# Patient Record
(demographics unavailable — no encounter records)

---

## 2024-12-14 NOTE — HISTORY OF PRESENT ILLNESS
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  Pt had an original resection and marsupialization after trauma and a bleed with a recurrence and sclerotherapy which was partially successful.  He has been followed since with serial US exams every 6 months.  He is otherwise asymptomatic but has restrictive activity.  His last US this month showed a splenic cyst that was 7.3 x 6.2 x 7.8cm compared to the previous US which was 7.7 x 6.7 x 8.4cm- so stable and maybe even slightly smaller.  He is here for a follow up visit.

## 2024-12-14 NOTE — ASSESSMENT
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  We will stay the course for now and continue to monitor him every 6 months with serial US exams.  He will be reevaluated for consideration of a possible laparoscopic splenectomy after his 18th birthday.  He will return to the office in 6 months after his next US or sooner if warranted.

## 2024-12-14 NOTE — PHYSICAL EXAM
[Acute Distress] : no acute distress [Alert] : alert [Toxic appearing] : well appearing [NL] : soft, not tender, not distended [TextBox_37] : Soft, non-tender, non-distended, with positive bowel sounds.  There are no masses and no organomegaly.  no mass in epigastrium nor left side and no spleen tip felt.

## 2024-12-14 NOTE — REASON FOR VISIT
[Follow-up - Scheduled] : a follow-up, scheduled visit for [Mother] : mother [FreeTextEntry3] : splenic cyst [FreeTextEntry4] : ARPAN GARCIA

## 2024-12-14 NOTE — CONSULT LETTER
[Dear  ___] : Dear  [unfilled], [Please see my note below.] : Please see my note below. [FreeTextEntry1] : I had the pleasure of seeing ESTELLE CRESPO in my office on Dec 14, 2024 Thank you very much for letting me participate in ESTELLE CRESPO 's care and I will keep you informed of his progress. Sincerely, Brijesh Caballero M.D.

## 2024-12-14 NOTE — HISTORY OF PRESENT ILLNESS
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  Pt had an original resection and marsupialization after trauma and a bleed with a recurrence and sclerotherapy which was partially successful.  He has been followed since with serial US exams every 6 months.  He is otherwise asymptomatic but has restrictive activity.  His last US this month showed a splenic cyst that was 7.3 x 6.2 x 7.8cm compared to the previous US which was 7.7 x 6.7 x 8.4cm- so stable and maybe even slightly smaller.  He is here for a follow up visit. No Repair - Repaired With Adjacent Surgical Defect Text (Leave Blank If You Do Not Want): After obtaining clear surgical margins the defect was repaired concurrently with another surgical defect which was in close approximation.

## 2025-07-29 NOTE — HISTORY OF PRESENT ILLNESS
[Up to date] : Up to date [Eats regular meals including adequate fruits and vegetables] : eats regular meals including adequate fruits and vegetables [At least 1 hour of physical activity a day] : at least 1 hour of physical activity a day [Screen time (except homework) less than 2 hours a day] : screen time (except homework) less than 2 hours a day [Has interests/participates in community activities/volunteers] : has interests/participates in community activities/volunteers. [Uses drugs] : uses drugs  [Exposure to drugs] : exposure to drugs [Exposure to alcohol] : exposure to alcohol [Uses safety belts/safety equipment] : uses safety belts/safety equipment  [NO] : No [Yes] : Patient goes to dentist yearly [Has ways to cope with stress] : has ways to cope with stress [Gets depressed, anxious, or irritable/has mood swings] : gets depressed, anxious, or irritable/has mood swings [Has thought about hurting self or considered suicide] : has thought about hurting self or considered suicide [de-identified] : No concerns. [Mother] : mother [No] : Patient does not go to dentist yearly [Sleep Concerns] : no sleep concerns [Drinks non-sweetened liquids] : does not drink non-sweetened liquids  [Has concerns about body or appearance] : does not have concerns about body or appearance [Has friends] : does not have friends [Uses electronic nicotine delivery system] : does not use electronic nicotine delivery system [Exposure to electronic nicotine delivery system] : no exposure to electronic nicotine delivery system [Uses tobacco] : does not use tobacco [Exposure to tobacco] : no exposure to tobacco [Drinks alcohol] : does not drink alcohol [Has problems with sleep] : does not have problems with sleep [de-identified] : lives with mom, two brothers (twin, 18), 2 cousins. safe at home, no food insecurity, feels safe at home [de-identified] : senior in highschool, wants to become , good grades, now suspended due to legal problems - has court for attempted murder [de-identified] : feels he is good weight, varied diet with fruits and vegetables [de-identified] : no friends "dont really want friends", not bullied, 6+ hours a day on phone (tik tok), enjoys boxing [de-identified] : smokes cannabis (wax) twice a week, doesn't drink alcohol ("maybe a sip here and there") [de-identified] : multiple female partners in the past, now only partner is girlfriend, does not use protection [de-identified] : see narrative for details [FreeTextEntry1] : 17 year-old M with PMH of eczema, and recurrent splenic cyst presenting for yearly wellness visit. Reports concern of "eczema on penis tip." Reports no itching, redness, burning on urination. Is sexually active with his girlfriend with no protection.   Patient reports feeling depressed non-suicidal self-injurious behavior for the first time 2 weeks ago where he cut himself on the shoulders and thighs, with no intent to die. He endorses passive SI ("wishes he was in a shooting") but no active SI, plan or intent. Also endorses low interest, guilt, low energy, psychomotor retardation. Patient is awaiting court case on August 10, 2025 for attempted murder. He is not interested in seeing a psychiatrist/therapist. Aware of suicide hotline/ED if he feels like he want to hurt himself.

## 2025-07-29 NOTE — PHYSICAL EXAM
[No Acute Distress] : no acute distress [Normocephalic] : normocephalic [Pink Nasal Mucosa] : pink nasal mucosa [Nonerythematous Oropharynx] : nonerythematous oropharynx [Supple, full passive range of motion] : supple, full passive range of motion [Clear to Auscultation Bilaterally] : clear to auscultation bilaterally [Regular Rate and Rhythm] : regular rate and rhythm [Normal S1, S2 audible] : normal S1, S2 audible [Soft] : soft [NonTender] : non tender [Non Distended] : non distended [Uncircumcised] : uncircumcised [No Abnormal Lymph Nodes Palpated] : no abnormal lymph nodes palpated [No Rash or Lesions] : no rash or lesions [Brennon: _____] : Brennon [unfilled] [Foreskin easily retractable] : foreskin easily retractable [No Testicular Masses] : no testicular masses [FreeTextEntry6] : Chaperone Genesis Gilbert MD present

## 2025-07-29 NOTE — DISCUSSION/SUMMARY
[Normal Growth] : growth [Normal Development] : development  [FreeTextEntry1] : 17 year old male with a history presenting for annual health care maintenance.  CIR reviewed and vaccines are up to date. Does not have a dental home. +Mental health concerns but no acute safety concerns. HEADSS assessment otherwise dose not demonstrate any health risks. Reviewed growth chart and no concerns at this time. Failed vision screen today.   - Labs: A1c, lipid profile  - Immunizations:  UTD - Referral: Dentist, Dermatology  - Failed vision screen but has ophthalmologist   #Passive SI #Nonsuicidal self-injurious behavior #MDD vs. Adjustment disorder Patient reports feeling depressed non-suicidal self-injurious behavior for the first time 2 weeks ago where he cut himself on the shoulders and thighs, with no intent to die. He endorses passive SI ("wishes he was in a shooting") but no active SI, plan or intent. Also endorses low interest, guilt, low energy, psychomotor retardation. This is in the context of legal problems (court case August 10 for attempted murder). - Mental Health:  Conducted a standardized annual depression screening using an approved tool (e.g., PHQ-9) and spent at least 5 - 15 minutes reviewing the results with the patient. - He is not interested in seeing a psychiatrist/therapist.  - Aware of suicide hotline/ED if he feels like he wants to hurt himself.  - Will follow up after court case   #Sexually active PT is sexuallty active with 1 female partner with no protection. Pt was concerned of bumps on penis, but was ensured that this was normal anatomy, no concerns.  - STI testing  #Eczema - dermatology referral   I spent at least 15 minutes providing preventive counseling and/or risk factor reduction interventions, addressing issues such as family problems, nutrition and exercise, substance abuse, sexual practices, injury prevention, and other health-related behaviors.

## 2025-07-29 NOTE — HISTORY OF PRESENT ILLNESS
[Up to date] : Up to date [Eats regular meals including adequate fruits and vegetables] : eats regular meals including adequate fruits and vegetables [At least 1 hour of physical activity a day] : at least 1 hour of physical activity a day [Screen time (except homework) less than 2 hours a day] : screen time (except homework) less than 2 hours a day [Has interests/participates in community activities/volunteers] : has interests/participates in community activities/volunteers. [Uses drugs] : uses drugs  [Exposure to drugs] : exposure to drugs [Exposure to alcohol] : exposure to alcohol [Uses safety belts/safety equipment] : uses safety belts/safety equipment  [NO] : No [Yes] : Patient goes to dentist yearly [Has ways to cope with stress] : has ways to cope with stress [Gets depressed, anxious, or irritable/has mood swings] : gets depressed, anxious, or irritable/has mood swings [Has thought about hurting self or considered suicide] : has thought about hurting self or considered suicide [de-identified] : No concerns. [Mother] : mother [No] : Patient does not go to dentist yearly [Sleep Concerns] : no sleep concerns [Drinks non-sweetened liquids] : does not drink non-sweetened liquids  [Has concerns about body or appearance] : does not have concerns about body or appearance [Has friends] : does not have friends [Uses electronic nicotine delivery system] : does not use electronic nicotine delivery system [Exposure to electronic nicotine delivery system] : no exposure to electronic nicotine delivery system [Uses tobacco] : does not use tobacco [Exposure to tobacco] : no exposure to tobacco [Drinks alcohol] : does not drink alcohol [Has problems with sleep] : does not have problems with sleep [de-identified] : lives with mom, two brothers (twin, 18), 2 cousins. safe at home, no food insecurity, feels safe at home [de-identified] : senior in highschool, wants to become , good grades, now suspended due to legal problems - has court for attempted murder [de-identified] : feels he is good weight, varied diet with fruits and vegetables [de-identified] : no friends "dont really want friends", not bullied, 6+ hours a day on phone (tik tok), enjoys boxing [de-identified] : smokes cannabis (wax) twice a week, doesn't drink alcohol ("maybe a sip here and there") [de-identified] : multiple female partners in the past, now only partner is girlfriend, does not use protection [de-identified] : see narrative for details [FreeTextEntry1] : 17 year-old M with PMH of eczema, and recurrent splenic cyst presenting for yearly wellness visit. Reports concern of "eczema on penis tip." Reports no itching, redness, burning on urination. Is sexually active with his girlfriend with no protection.   Patient reports feeling depressed non-suicidal self-injurious behavior for the first time 2 weeks ago where he cut himself on the shoulders and thighs, with no intent to die. He endorses passive SI ("wishes he was in a shooting") but no active SI, plan or intent. Also endorses low interest, guilt, low energy, psychomotor retardation. Patient is awaiting court case on August 10, 2025 for attempted murder. He is not interested in seeing a psychiatrist/therapist. Aware of suicide hotline/ED if he feels like he want to hurt himself.